# Patient Record
Sex: MALE | Race: WHITE | Employment: FULL TIME | URBAN - METROPOLITAN AREA
[De-identification: names, ages, dates, MRNs, and addresses within clinical notes are randomized per-mention and may not be internally consistent; named-entity substitution may affect disease eponyms.]

---

## 2021-07-06 ENCOUNTER — APPOINTMENT (OUTPATIENT)
Dept: CT IMAGING | Age: 21
End: 2021-07-06
Attending: EMERGENCY MEDICINE
Payer: COMMERCIAL

## 2021-07-06 ENCOUNTER — HOSPITAL ENCOUNTER (EMERGENCY)
Age: 21
Discharge: HOME | End: 2021-07-07
Attending: EMERGENCY MEDICINE
Payer: COMMERCIAL

## 2021-07-06 DIAGNOSIS — R51.9 NONINTRACTABLE HEADACHE, UNSPECIFIED CHRONICITY PATTERN, UNSPECIFIED HEADACHE TYPE: Primary | ICD-10-CM

## 2021-07-06 PROCEDURE — 70450 CT HEAD/BRAIN W/O DYE: CPT

## 2021-07-06 PROCEDURE — 72125 CT NECK SPINE W/O DYE: CPT

## 2021-07-06 PROCEDURE — 99283 EMERGENCY DEPT VISIT LOW MDM: CPT

## 2021-07-07 VITALS
WEIGHT: 170 LBS | DIASTOLIC BLOOD PRESSURE: 60 MMHG | BODY MASS INDEX: 26.68 KG/M2 | RESPIRATION RATE: 22 BRPM | TEMPERATURE: 99.1 F | SYSTOLIC BLOOD PRESSURE: 100 MMHG | HEART RATE: 75 BPM | HEIGHT: 67 IN | OXYGEN SATURATION: 98 %

## 2021-07-07 PROCEDURE — 36415 COLL VENOUS BLD VENIPUNCTURE: CPT

## 2021-07-07 PROCEDURE — 74011250637 HC RX REV CODE- 250/637: Performed by: EMERGENCY MEDICINE

## 2021-07-07 RX ORDER — IBUPROFEN 600 MG/1
600 TABLET ORAL
Qty: 20 TABLET | Refills: 0 | Status: SHIPPED | OUTPATIENT
Start: 2021-07-07

## 2021-07-07 RX ORDER — CYCLOBENZAPRINE HCL 10 MG
10 TABLET ORAL
Status: COMPLETED | OUTPATIENT
Start: 2021-07-07 | End: 2021-07-07

## 2021-07-07 RX ADMIN — CYCLOBENZAPRINE 10 MG: 10 TABLET, FILM COATED ORAL at 02:51

## 2021-07-07 NOTE — ED PROVIDER NOTES
EMERGENCY DEPARTMENT HISTORY AND PHYSICAL EXAM      Date: 7/6/2021  Patient Name: Leann Diane      History of Presenting Illness     Chief Complaint   Patient presents with    Headache       History Provided By: Patient    HPI: Leann Diane, 24 y.o. male with a past medical history significant Psych disorder, ADHD. Chronic headache presents to the ED with cc of headache since yesterday. Patient said this is typical of his headache but slightly worsened than normal.  Patient denies any injury. No fever. No vomiting. No other complaints at this time. Patient denies weakness or numbness, slurred speech, blurred vision. There are no other complaints, changes, or physical findings at this time. PCP: None    Current Outpatient Medications   Medication Sig Dispense Refill    ibuprofen (MOTRIN) 600 mg tablet Take 1 Tablet by mouth every six (6) hours as needed for Pain. 20 Tablet 0    citalopram (CELEXA) 10 mg tablet Take  by mouth daily.  hydrOXYzine (VISTARIL) 25 mg capsule Take 25 mg by mouth three (3) times daily as needed.  methylphenidate (DAYTRANA) 20 mg/9 hr 1 Patch by TransDERmal route daily. wear patch for 9 hours only each day          Past History     Past Medical History:  Past Medical History:   Diagnosis Date    Allergic rhinitis     Psychiatric disorder     ADHD       Past Surgical History:  Past Surgical History:   Procedure Laterality Date    HX HEENT      Occular surgery       Family History:  Family History   Problem Relation Age of Onset    Other Mother         Snoring       Social History:  Social History     Tobacco Use    Smoking status: Never Smoker    Smokeless tobacco: Never Used   Substance Use Topics    Alcohol use: No    Drug use: Not on file       Allergies:  No Known Allergies      Review of Systems     Review of Systems   Constitutional: Negative. HENT: Negative. Eyes: Negative. Respiratory: Negative. Cardiovascular: Negative. Gastrointestinal: Negative. Endocrine: Negative. Genitourinary: Negative. Musculoskeletal: Negative. Skin: Negative. Allergic/Immunologic: Negative. Neurological: Positive for headaches. Hematological: Negative. Psychiatric/Behavioral: Negative. All other systems reviewed and are negative. Physical Exam     Physical Exam  Vitals and nursing note reviewed. Constitutional:       General: He is not in acute distress. Appearance: Normal appearance. He is normal weight. He is not ill-appearing, toxic-appearing or diaphoretic. HENT:      Head: Normocephalic and atraumatic. Right Ear: Tympanic membrane and ear canal normal.      Left Ear: Tympanic membrane and ear canal normal.      Nose: Nose normal. No congestion or rhinorrhea. Mouth/Throat:      Pharynx: Oropharynx is clear. No oropharyngeal exudate or posterior oropharyngeal erythema. Eyes:      General: No scleral icterus. Right eye: No discharge. Left eye: No discharge. Extraocular Movements: Extraocular movements intact. Conjunctiva/sclera: Conjunctivae normal.      Pupils: Pupils are equal, round, and reactive to light. Cardiovascular:      Rate and Rhythm: Normal rate and regular rhythm. Pulses: Normal pulses. Heart sounds: Normal heart sounds. Pulmonary:      Effort: Pulmonary effort is normal. No respiratory distress. Breath sounds: Normal breath sounds. No stridor. No wheezing, rhonchi or rales. Chest:      Chest wall: No tenderness. Abdominal:      General: Abdomen is flat. Bowel sounds are normal. There is no distension. Palpations: Abdomen is soft. Tenderness: There is no abdominal tenderness. There is no right CVA tenderness, left CVA tenderness, guarding or rebound. Musculoskeletal:         General: No swelling, tenderness, deformity or signs of injury. Normal range of motion. Cervical back: Normal range of motion and neck supple.  No rigidity or tenderness. Right lower leg: No edema. Left lower leg: No edema. Lymphadenopathy:      Cervical: No cervical adenopathy. Skin:     General: Skin is warm and dry. Coloration: Skin is not jaundiced or pale. Findings: No bruising, erythema, lesion or rash. Neurological:      General: No focal deficit present. Mental Status: He is alert and oriented to person, place, and time. Mental status is at baseline. Cranial Nerves: No cranial nerve deficit. Sensory: No sensory deficit. Motor: No weakness. Coordination: Coordination normal.      Gait: Gait normal.   Psychiatric:         Thought Content: Thought content normal.         Judgment: Judgment normal.      Comments: Patient is mildly anxious. Is mildly depressed. Lab and Diagnostic Study Results     Labs -   No results found for this or any previous visit (from the past 12 hour(s)). Radiologic Studies -   [unfilled]  CT Results  (Last 48 hours)               07/06/21 2306  CT HEAD WO CONT Final result    Impression:      No acute intracranial bleed. Follow-up as clinically indicated. Narrative:  CT head without contrast       Dose reduction: All CT scans at this facility are performed using dose reduction   optimization techniques as appropriate to a performed exam including the   following: Automated exposure control, adjustments of the mA and/or kV according   to patient size, or use of iterative reconstruction technique. Indication: Headache       Findings:       No acute intracranial bleed, mass effect, midline shift or extra-axial fluid   collection is identified. Lateral ventricles are normal in size. Gray-white   matter differentiation is maintained. A noncontrast head CT does not exclude the   possibility of an acute ischemic stroke, or subtle parenchymal or vascular   abnormalities. MRI is more sensitive for evaluation of brain parenchyma. No   skull fracture.  No fluid in the visualized paranasal sinuses or mastoid air   cells. 07/06/21 2306  CT SPINE CERV WO CONT Final result    Impression:      No acute fracture. Follow-up as clinically indicated. Narrative:  CT cervical spine without contrast       Dose reduction: All CT scans at this facility are performed using dose reduction   optimization techniques as appropriate to a performed exam including the   following: Automated exposure control, adjustments of the mA and/or kV according   to patient size, or use of iterative reconstruction technique. Indication: Neck pain       Findings:       No acute fracture is identified. Alignment is maintained. No significant central   canal or neural foraminal narrowing. Minimal reversal of upper cervical spine   lordosis. Mild prominence of the adenoids. Please note that this is not the   optimal study for evaluation of soft tissues or contents of the spinal canal,   including spinal cord. MRI is more sensitive in this regard. CXR Results  (Last 48 hours)    None          Medical Decision Making and ED Course   - I am the first and primary provider for this patient AND AM THE PRIMARY PROVIDER OF RECORD. - I reviewed the vital signs, available nursing notes, past medical history, past surgical history, family history and social history. - Initial assessment performed. The patients presenting problems have been discussed, and the staff are in agreement with the care plan formulated and outlined with them. I have encouraged them to ask questions as they arise throughout their visit. Vital Signs-Reviewed the patient's vital signs.     Patient Vitals for the past 12 hrs:   Temp Pulse Resp BP SpO2   07/07/21 0255  75 22 100/60 98 %   07/07/21 0114  67 18 (!) 122/57 98 %   07/06/21 2250 99.1 °F (37.3 °C) 81 18 115/70 98 %           Records Reviewed: Nursing Notes        ED Course:              Provider Notes (Medical Decision Making):     MDM  Number of Diagnoses or Management Options  Nonintractable headache, unspecified chronicity pattern, unspecified headache type: new, needed workup  Diagnosis management comments: Differential diagnosis include headache, migraine headache, tension headache, sinus headache, depression, anxiety. Amount and/or Complexity of Data Reviewed  Tests in the radiology section of CPT®: ordered and reviewed    Risk of Complications, Morbidity, and/or Mortality  Presenting problems: high  Diagnostic procedures: high  Management options: high  General comments: Patient remained stable throughout the course of treatment. CT of head was essentially unremarkable. CT of neck was also unremarkable. Patient stated that he took Tylenol before coming to emergency department. He has said the headache is almost gone with taking Tylenol. I advised patient if he needs more pain medicine. He refused. Case discussed with patient. I advised him to follow-up with the PCP to have outpatient work-up done for the headache. Patient understood and agree with her management. Patient Progress  Patient progress: improved             Consultations:       Consultations:         Procedures and Critical Care       Performed by: Sandy Robert DO      NOTES   :  I have spent critical care time involved in lab review, consultations with specialist, family decision- making, bedside attention and documentation. This time excludes time spent in any separate billed procedures. During this entire length of time I was immediately available to the patient . Sandy Robert DO        Disposition     Disposition: Condition improved    Discharged    Remove if not discharged  DISCHARGE PLAN:  1. Cannot display discharge medications since this patient is not currently admitted.     2.   Follow-up Information     Follow up With Specialties Details Why Contact Judge Berna MD Pediatric Medicine Schedule an appointment as soon as possible for a visit in 1 day  [address]  [phone]      Ruthann Webber MD Pediatric Medicine  If symptoms worsen [address] [address]  [phone]      [facility] EMERGENCY DEPT Emergency Medicine   [address] [address] [phone]        3. Return to ED if worse   4. Discharge Medication List as of 7/7/2021  2:54 AM      START taking these medications    Details   ibuprofen (MOTRIN) 600 mg tablet Take 1 Tablet by mouth every six (6) hours as needed for Pain., Normal, Disp-20 Tablet, R-0         CONTINUE these medications which have NOT CHANGED    Details   citalopram (CELEXA) 10 mg tablet FDA Warning do not exceed 40mg per day see FDA link below. Take  by mouth daily. Historical Med      hydrOXYzine (VISTARIL) 25 mg capsule Take 25 mg by mouth three (3) times daily as needed. Historical Med, 25 mg      methylphenidate (DAYTRANA) 20 mg/9 hr 1 Patch by TransDERmal route daily. wear patch for 9 hours only each day Historical Med, 1 Patch             Diagnosis     Clinical Impression:   1. Nonintractable headache, unspecified chronicity pattern, unspecified headache type        Attestations:    Audelia Carson, DO    Please note that this dictation was completed with [employer], the computer voice recognition software. Quite often unanticipated grammatical, syntax, homophones, and other interpretive errors are inadvertently transcribed by the computer software. Please disregard these errors. Please excuse any errors that have escaped final proofreading. Thank you.

## 2023-05-25 RX ORDER — CITALOPRAM HYDROBROMIDE 10 MG/1
TABLET ORAL DAILY
COMMUNITY

## 2023-05-25 RX ORDER — METHYLPHENIDATE 2.2 MG/H
1 PATCH TRANSDERMAL DAILY
COMMUNITY

## 2023-05-25 RX ORDER — HYDROXYZINE PAMOATE 25 MG/1
25 CAPSULE ORAL 3 TIMES DAILY PRN
COMMUNITY

## 2023-05-25 RX ORDER — IBUPROFEN 600 MG/1
600 TABLET ORAL EVERY 6 HOURS PRN
COMMUNITY
Start: 2021-07-07